# Patient Record
Sex: MALE | Race: WHITE | NOT HISPANIC OR LATINO | Employment: UNEMPLOYED | URBAN - METROPOLITAN AREA
[De-identification: names, ages, dates, MRNs, and addresses within clinical notes are randomized per-mention and may not be internally consistent; named-entity substitution may affect disease eponyms.]

---

## 2021-04-29 ENCOUNTER — NURSE TRIAGE (OUTPATIENT)
Dept: PEDIATRICS | Facility: CLINIC | Age: 31
End: 2021-04-29

## 2021-04-29 ENCOUNTER — OFFICE VISIT (OUTPATIENT)
Dept: PEDIATRICS | Facility: CLINIC | Age: 31
End: 2021-04-29
Payer: COMMERCIAL

## 2021-04-29 VITALS
OXYGEN SATURATION: 98 % | DIASTOLIC BLOOD PRESSURE: 80 MMHG | TEMPERATURE: 98.3 F | WEIGHT: 301.8 LBS | SYSTOLIC BLOOD PRESSURE: 146 MMHG | HEART RATE: 86 BPM

## 2021-04-29 DIAGNOSIS — R10.11 RIGHT UPPER QUADRANT PAIN: Primary | ICD-10-CM

## 2021-04-29 DIAGNOSIS — E11.65 TYPE 2 DIABETES MELLITUS WITH HYPERGLYCEMIA, WITHOUT LONG-TERM CURRENT USE OF INSULIN (H): ICD-10-CM

## 2021-04-29 LAB
ALBUMIN SERPL-MCNC: 4 G/DL (ref 3.4–5)
ALP SERPL-CCNC: 64 U/L (ref 40–150)
ALT SERPL W P-5'-P-CCNC: 95 U/L (ref 0–70)
ANION GAP SERPL CALCULATED.3IONS-SCNC: 13 MMOL/L (ref 3–14)
AST SERPL W P-5'-P-CCNC: 47 U/L (ref 0–45)
BASOPHILS # BLD AUTO: 0 10E9/L (ref 0–0.2)
BASOPHILS NFR BLD AUTO: 0.6 %
BILIRUB SERPL-MCNC: 1.1 MG/DL (ref 0.2–1.3)
BUN SERPL-MCNC: 12 MG/DL (ref 7–30)
CALCIUM SERPL-MCNC: 9.6 MG/DL (ref 8.5–10.1)
CHLORIDE SERPL-SCNC: 98 MMOL/L (ref 94–109)
CO2 SERPL-SCNC: 30 MMOL/L (ref 20–32)
CREAT SERPL-MCNC: 0.7 MG/DL (ref 0.66–1.25)
DIFFERENTIAL METHOD BLD: NORMAL
EOSINOPHIL # BLD AUTO: 0.1 10E9/L (ref 0–0.7)
EOSINOPHIL NFR BLD AUTO: 1.7 %
ERYTHROCYTE [DISTWIDTH] IN BLOOD BY AUTOMATED COUNT: 12.9 % (ref 10–15)
GFR SERPL CREATININE-BSD FRML MDRD: >90 ML/MIN/{1.73_M2}
GLUCOSE SERPL-MCNC: 256 MG/DL (ref 70–99)
HBA1C MFR BLD: 9.7 % (ref 0–5.6)
HCT VFR BLD AUTO: 43.9 % (ref 40–53)
HGB BLD-MCNC: 14.4 G/DL (ref 13.3–17.7)
LIPASE SERPL-CCNC: 61 U/L (ref 73–393)
LYMPHOCYTES # BLD AUTO: 2.4 10E9/L (ref 0.8–5.3)
LYMPHOCYTES NFR BLD AUTO: 34 %
MCH RBC QN AUTO: 28.9 PG (ref 26.5–33)
MCHC RBC AUTO-ENTMCNC: 32.8 G/DL (ref 31.5–36.5)
MCV RBC AUTO: 88 FL (ref 78–100)
MONOCYTES # BLD AUTO: 0.9 10E9/L (ref 0–1.3)
MONOCYTES NFR BLD AUTO: 12.9 %
NEUTROPHILS # BLD AUTO: 3.6 10E9/L (ref 1.6–8.3)
NEUTROPHILS NFR BLD AUTO: 50.8 %
PLATELET # BLD AUTO: 194 10E9/L (ref 150–450)
POTASSIUM SERPL-SCNC: 4.5 MMOL/L (ref 3.4–5.3)
PROT SERPL-MCNC: 7.7 G/DL (ref 6.8–8.8)
RBC # BLD AUTO: 4.99 10E12/L (ref 4.4–5.9)
SODIUM SERPL-SCNC: 141 MMOL/L (ref 133–144)
WBC # BLD AUTO: 7 10E9/L (ref 4–11)

## 2021-04-29 PROCEDURE — 99203 OFFICE O/P NEW LOW 30 MIN: CPT | Mod: GE | Performed by: STUDENT IN AN ORGANIZED HEALTH CARE EDUCATION/TRAINING PROGRAM

## 2021-04-29 PROCEDURE — 80053 COMPREHEN METABOLIC PANEL: CPT | Performed by: INTERNAL MEDICINE

## 2021-04-29 PROCEDURE — 36415 COLL VENOUS BLD VENIPUNCTURE: CPT | Performed by: INTERNAL MEDICINE

## 2021-04-29 PROCEDURE — 85025 COMPLETE CBC W/AUTO DIFF WBC: CPT | Performed by: INTERNAL MEDICINE

## 2021-04-29 PROCEDURE — 83690 ASSAY OF LIPASE: CPT | Performed by: INTERNAL MEDICINE

## 2021-04-29 PROCEDURE — 83036 HEMOGLOBIN GLYCOSYLATED A1C: CPT | Performed by: INTERNAL MEDICINE

## 2021-04-29 RX ORDER — FAMOTIDINE 40 MG/1
40 TABLET, FILM COATED ORAL DAILY
COMMUNITY

## 2021-04-29 RX ORDER — IBUPROFEN 800 MG/1
800 TABLET, FILM COATED ORAL EVERY 8 HOURS PRN
COMMUNITY

## 2021-04-29 RX ORDER — METFORMIN HCL 500 MG
TABLET, EXTENDED RELEASE 24 HR ORAL
Qty: 129 TABLET | Refills: 0 | Status: SHIPPED | OUTPATIENT
Start: 2021-04-29 | End: 2021-06-04

## 2021-04-29 RX ORDER — LANCETS
EACH MISCELLANEOUS
Qty: 30 EACH | Refills: 6 | Status: SHIPPED | OUTPATIENT
Start: 2021-04-29

## 2021-04-29 RX ORDER — GLUCOSAMINE HCL/CHONDROITIN SU 500-400 MG
CAPSULE ORAL
Qty: 100 EACH | Refills: 3 | Status: SHIPPED | OUTPATIENT
Start: 2021-04-29

## 2021-04-29 ASSESSMENT — PAIN SCALES - GENERAL: PAINLEVEL: MILD PAIN (3)

## 2021-04-29 NOTE — PROGRESS NOTES
Assessment & Plan     Right upper quadrant pain  ddx cholelithiasis, hepatis, pancreatitis, IBS less likely to be infectious (cholecystisis, cholangitis). Plan for labs and imaging as below. Discussed that if pain worsens, not tolerating oral intake, fevers, etc he should be seen sooner or go to the ER.   - Comprehensive metabolic panel (BMP + Alb, Alk Phos, ALT, AST, Total. Bili, TP)  - Lipase  - CBC with platelets and differential  - US Abdomen Complete; [id number]    New diagnosis type 2 diabetes  Glucose 256 on CMP and confirmed on A1c  - start metformin 500 mg and increase every few days to goal of 1000 mg BID  - diabetic education referral  - follow up in clinic within the next month  - glucometer  - called and discussed the above with El on 4/29/21 at 1500     Tobacco Cessation:   reports that he has been smoking. He has quit using smokeless tobacco.    Return in about 2 weeks (around 5/13/2021) for Follow up abdominal pain and blood pressure , Follow up.    Margo Bishop MD  Internal Medicine & Pediatrics PGY4  [facility]      Subjective   Antwan is a 31 year old who presents for the following health issues     HPI     Abdominal/Flank Pain  Onset/Duration: Abdominal pain upper right quadrant   Description:   Character: Sharp and Burning  Location: right upper quadrant  Radiation: through to back when pain increases  Intensity: mild, severe  Progression of Symptoms:  Worsening especially in the evenings   Accompanying Signs & Symptoms:  Fever/Chills: no  Gas/Bloating: no  Nausea: no  Vomitting: no  Diarrhea: loose stools   Constipation: no  Dysuria or Hematuria: no  History:   Trauma: no  Previous similar pain: YES - 2-3 years ago but only for a day or two  Previous tests done: none  Precipitating factors:   Does the pain change with:     Food: no    Bowel Movement: no    Urination: no  Therapies tried and outcome: Pepcid not really effective - takes this long term for reflux - pain is  different    Worse at night, builds throughout the day. 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 Comes and goes, with pain spikes to 8 out of 10, but sometimes cannot even feel it. No nausea, vomiting, diarrhea, constipation, fevers, chills, black or bloody stools, urinary symptoms. No IVDU, drinks 1-2 alcoholic beverages per week on average. No family history of liver, gallbladder or kidney disease. Pain does not wake him up at night. No injuries. Able to eat and drink normally - pain not related to this.     Review of Systems   Constitutional, HEENT, cardiovascular, pulmonary, GI, , musculoskeletal, neuro, skin, endocrine and psych systems are negative, except as otherwise noted.      Objective    BP (!) 146/80   Pulse 86   Temp 98.3  F (36.8  C)   Wt 136.9 kg (301 lb 12.8 oz)   SpO2 98%   There is no height or weight on file to calculate BMI.  Physical Exam   GENERAL: healthy, alert and no distress  EYES: Eyes grossly normal to inspection, conjunctivae and sclerae normal  NECK: no adenopathy, no asymmetry, masses, or scars   RESP: lungs clear to auscultation - no rales, rhonchi or wheezes  CV: regular rate and rhythm, normal S1 S2, no S3 or S4, no murmur, no peripheral edema and peripheral pulses strong  ABDOMEN: soft, obese, non-tender, unable to palpate liver or spleen due to body habitus, bowel sounds normal, no rebound or guarding  MS: no gross musculoskeletal defects noted, no edema, no CVA tenderness  SKIN: no suspicious lesions or rashes over abdomen  NEURO: mentation intact and speech normal  PSYCH: mentation appears normal, affect normal/bright    Results for orders placed or performed in visit on 04/29/21 (from the past 24 hour(s))   Comprehensive metabolic panel (BMP + Alb, Alk Phos, ALT, AST, Total. Bili, TP)   Result Value Ref Range    Sodium 141 133 - 144 mmol/L    Potassium 4.5 3.4 - 5.3 mmol/L    Chloride 98 94 - 109 mmol/L    Carbon Dioxide 30 20 - 32 mmol/L    Anion Gap 13 3 - 14 mmol/L     Glucose 256 (H) 70 - 99 mg/dL    Urea Nitrogen 12 7 - 30 mg/dL    Creatinine 0.70 0.66 - 1.25 mg/dL    GFR Estimate >90 >60 mL/min/[1.73_m2]    GFR Estimate If Black >90 >60 mL/min/[1.73_m2]    Calcium 9.6 8.5 - 10.1 mg/dL    Bilirubin Total 1.1 0.2 - 1.3 mg/dL    Albumin 4.0 3.4 - 5.0 g/dL    Protein Total 7.7 6.8 - 8.8 g/dL    Alkaline Phosphatase 64 40 - 150 U/L    ALT 95 (H) 0 - 70 U/L    AST 47 (H) 0 - 45 U/L   CBC with platelets and differential   Result Value Ref Range    WBC 7.0 4.0 - 11.0 10e9/L    RBC Count 4.99 4.4 - 5.9 10e12/L    Hemoglobin 14.4 13.3 - 17.7 g/dL    Hematocrit 43.9 40.0 - 53.0 %    MCV 88 78 - 100 fl    MCH 28.9 26.5 - 33.0 pg    MCHC 32.8 31.5 - 36.5 g/dL    RDW 12.9 10.0 - 15.0 %    Platelet Count 194 150 - 450 10e9/L    % Neutrophils 50.8 %    % Lymphocytes 34.0 %    % Monocytes 12.9 %    % Eosinophils 1.7 %    % Basophils 0.6 %    Absolute Neutrophil 3.6 1.6 - 8.3 10e9/L    Absolute Lymphocytes 2.4 0.8 - 5.3 10e9/L    Absolute Monocytes 0.9 0.0 - 1.3 10e9/L    Absolute Eosinophils 0.1 0.0 - 0.7 10e9/L    Absolute Basophils 0.0 0.0 - 0.2 10e9/L    Diff Method Automated Method        Physician Attestation   I, Felix Nichols MD, agree with the findings and plan of care as documented in the note.      Items personally reviewed/procedural attestation: vitals, labs and agree with the interpretation documented in the note.    Felix Nichols MD

## 2021-04-29 NOTE — PROGRESS NOTES
The Pt arrived to the clinic at 0900 reporting URQ ABD pain. The pain started a week ago, has been intermittent. Pain is dull at times, then sharp. Rating pain currently at a 3/10. Last night had an episode of pain at a 8/10. Lasted about 1.5 hours. Denies vomiting or loose stools. Stools have been softer than normal. No dark, tarry stools or blood in the stool. No fevers. No hx of gall bladder concerns. Has hx of GERD, however is well controlled. Denies chest pain or chest pressure. No trouble breathing. No SOB. Has a hx of SBO 3 years ago. Passing gas and stool without difficulty.     This pain has been off and on for the last year, with a worsening in the last week.     Patient is resting comfortably in a chair, not in apparent distress. Vitals entered.     Huddled with TC, scheduled with resident at 0930.     Sharonda Osborn RN   [facility] -- Triage Nurse

## 2021-04-29 NOTE — TELEPHONE ENCOUNTER
The Pt arrived to the clinic at 0900 reporting URQ ABD pain. The pain started a week ago, has been intermittent. Pain is dull at times, then sharp. Rating pain currently at a 3/10. Last night had an episode of pain at a 8/10. Lasted about 1.5 hours. Denies vomiting or loose stools. Stools have been softer than normal. No dark, tarry stools or blood in the stool. No fevers. No hx of gall bladder concerns. Has hx of GERD, however is well controlled. Denies chest pain or chest pressure. No trouble breathing. No SOB.     Patient is resting comfortably in a chair, not in apparent distress. Vitals entered.     Huddled with TC, scheduled with resident at 0930.     Sharonda Osborn RN   [facility] -- Triage Nurse        Additional Information    Negative: Passed out (i.e., fainted, collapsed and was not responding)    Negative: Shock suspected (e.g., cold/pale/clammy skin, too weak to stand, low BP, rapid pulse)    Negative: Sounds like a life-threatening emergency to the triager    Pain is mainly in upper abdomen (if needed ask: 'is it mainly above the belly button?')    Negative: Passed out (i.e., fainted, collapsed and was not responding)    Negative: Shock suspected (e.g., cold/pale/clammy skin, too weak to stand, low BP, rapid pulse)    Negative: Visible sweat on face or sweat is dripping down    Negative: Chest pain    Negative: SEVERE abdominal pain (e.g., excruciating)    Negative: Pain lasting > 10 minutes and over 50 years old    Negative: Pain lasting > 10 minutes and over 40 years old and associated chest, arm, neck, upper back, or jaw pain    Negative: Pain lasting > 10 minutes and over 35 years old and at least one cardiac risk factor    Negative: Pain lasting > 10 minutes and history of heart disease (i.e., heart attack, bypass surgery, angina, angioplasty, CHF)    Negative: Recent injury to the abdomen    Negative: Vomiting red blood or black (coffee ground) material    Negative: Bloody, black, or tarry  "bowel movements    Negative: Pregnant > 24 weeks and hand or face swelling    Negative: Constant abdominal pain lasting > 2 hours    Negative: Vomiting bile (green color)    Negative: Patient sounds very sick or weak to the triager    Negative: Vomiting and abdomen looks much more swollen than usual    Negative: White of the eyes have turned yellow (i.e.,  jaundice)    Negative: Fever > 103 F (39.4 C)    Negative: Fever > 101 F (38.3 C) and over 60 years of age    Negative: Fever > 100.0 F (37.8 C) and has diabetes mellitus or a weak immune system (e.g., HIV positive, cancer chemotherapy, organ transplant, splenectomy, chronic steroids)    Negative: Fever > 100.0 F (37.8 C) and bedridden (e.g., nursing home patient, stroke, chronic illness, recovering from surgery)    Answer Assessment - Initial Assessment Questions  1. LOCATION: \"Where does it hurt?\"       URQ  2. RADIATION: \"Does the pain shoot anywhere else?\" (e.g., chest, back)      Radiates to the back, mainly in the evenings.   3. ONSET: \"When did the pain begin?\" (Minutes, hours or days ago)       Started last Monday  4. SUDDEN: \"Gradual or sudden onset?\"      Gradual   5. PATTERN \"Does the pain come and go, or is it constant?\"     - If constant: \"Is it getting better, staying the same, or worsening?\"       (Note: Constant means the pain never goes away completely; most serious pain is constant and it progresses)      - If intermittent: \"How long does it last?\" \"Do you have pain now?\"      (Note: Intermittent means the pain goes away completely between bouts)      Intermittent, constant at night.   6. SEVERITY: \"How bad is the pain?\"  (e.g., Scale 1-10; mild, moderate, or severe)     - MILD (1-3): doesn't interfere with normal activities, abdomen soft and not tender to touch      - MODERATE (4-7): interferes with normal activities or awakens from sleep, tender to touch      - SEVERE (8-10): excruciating pain, doubled over, unable to do any normal activities  " "      Rating at 3/10. Last night was 8/10, lasted about 1.5 hours.   7. RECURRENT SYMPTOM: \"Have you ever had this type of abdominal pain before?\" If so, ask: \"When was the last time?\" and \"What happened that time?\"       Happened a couple of years ago, Patient treated at home, it did resolve.   8. CAUSE: \"What do you think is causing the abdominal pain?\"      Unknown.   9. RELIEVING/AGGRAVATING FACTORS: \"What makes it better or worse?\" (e.g., movement, antacids, bowel movement)      Laying down makes it better, better on the Right side. Getting up and moving helps. Sitting makes it worse.   10. OTHER SYMPTOMS: \"Has there been any vomiting, diarrhea, constipation, or urine problems?\"        BM's have been softer than usual, hx of bowl obstruction 3 years ago.    Protocols used: ABDOMINAL PAIN - MALE-A-OH, ABDOMINAL PAIN - UPPER-A-OH      "

## 2021-04-30 ENCOUNTER — TELEPHONE (OUTPATIENT)
Dept: PEDIATRICS | Facility: CLINIC | Age: 31
End: 2021-04-30

## 2021-04-30 NOTE — TELEPHONE ENCOUNTER
Diabetes Education Scheduling Outreach #1:    Call to patient to schedule. Left message with phone number to call to schedule.    Plan for 2nd outreach attempt within 1 week.    Christy Chang  [place] [facility]  Diabetes and Nutrition Scheduling

## 2021-05-02 ENCOUNTER — HEALTH MAINTENANCE LETTER (OUTPATIENT)
Age: 31
End: 2021-05-02

## 2021-05-04 ENCOUNTER — TELEPHONE (OUTPATIENT)
Dept: PEDIATRICS | Facility: CLINIC | Age: 31
End: 2021-05-04

## 2021-05-04 ENCOUNTER — HOSPITAL ENCOUNTER (OUTPATIENT)
Dept: ULTRASOUND IMAGING | Facility: CLINIC | Age: 31
Discharge: HOME | End: 2021-05-04
Attending: INTERNAL MEDICINE | Admitting: INTERNAL MEDICINE

## 2021-05-04 DIAGNOSIS — R10.11 RIGHT UPPER QUADRANT PAIN: ICD-10-CM

## 2021-05-04 PROCEDURE — 76700 US EXAM ABDOM COMPLETE: CPT

## 2021-05-04 NOTE — TELEPHONE ENCOUNTER
Responded to his questions in the result note for the ultrasound.  Please refer to that encounter for recommendations.    Felix Nichols MD

## 2021-05-04 NOTE — TELEPHONE ENCOUNTER
Patient left a voicemail on [facility] direct line.     Patient is requesting results and plan from Abdominal Ultrasound on 5/4/21.    Routing to Dr. Nichols to advise.     Tyler Tucker RN on 5/4/2021 at 3:52 PM

## 2021-05-04 NOTE — RESULT ENCOUNTER NOTE
Dear Antwan,    The results of your ultrasound show that you have NAFLD (non-alcoholic fatty liver disease), meaning you have fat infiltrates in your liver, which is the cause of your elevation in liver enzymes.  The good news is you do not have any gall bladder stones or blocked gall bladder ducts.  The treatment for NAFLD is to manage your chronic medical conditions, including the new diagnosis of diabetes, and aggressive lifestyle changes (diet and exercise and weight loss) and monitor your liver enzymes.  You may want to discuss starting a cholesterol medication as well to treat the fat in your liver.    As discussed with Dr. Bishop, I recommend you start metformin and follow-up with the diabetic educator in the next week or so to learn about how to manage diabetes and how to check your glucose levels.  I recommend you schedule a follow-up appointment with a provider in clinic in 1 month, after you have met with the diabetic educator and learned how to log your sugar levels.  You should bring your glucose log to your visit.    Please feel free to call with any questions.      Sincerely,    Felix Nichols MD

## 2021-05-05 NOTE — TELEPHONE ENCOUNTER
Called pt and discussed results and instructions of the ultrasound. He verbalizes understanding and will await diabetic ed appt. Yolanda Escalona RN on 5/5/2021 at 10:55 AM

## 2021-05-10 NOTE — TELEPHONE ENCOUNTER
Diabetes Education Scheduling Outreach #2:    [email or web address] message sent to call to schedule.    [facility]  Diabetes and Nutrition Scheduling

## 2021-05-26 ENCOUNTER — RECORDS - HEALTHEAST (OUTPATIENT)
Dept: ADMINISTRATIVE | Facility: CLINIC | Age: 31
End: 2021-05-26

## 2021-05-28 ENCOUNTER — RECORDS - HEALTHEAST (OUTPATIENT)
Dept: ADMINISTRATIVE | Facility: CLINIC | Age: 31
End: 2021-05-28

## 2021-08-22 ENCOUNTER — HEALTH MAINTENANCE LETTER (OUTPATIENT)
Age: 31
End: 2021-08-22

## 2021-10-17 ENCOUNTER — HEALTH MAINTENANCE LETTER (OUTPATIENT)
Age: 31
End: 2021-10-17

## 2021-12-12 ENCOUNTER — HEALTH MAINTENANCE LETTER (OUTPATIENT)
Age: 31
End: 2021-12-12

## 2022-04-02 ENCOUNTER — HEALTH MAINTENANCE LETTER (OUTPATIENT)
Age: 32
End: 2022-04-02

## 2022-05-28 ENCOUNTER — HEALTH MAINTENANCE LETTER (OUTPATIENT)
Age: 32
End: 2022-05-28

## 2022-07-23 ENCOUNTER — HEALTH MAINTENANCE LETTER (OUTPATIENT)
Age: 32
End: 2022-07-23

## 2022-10-01 ENCOUNTER — HEALTH MAINTENANCE LETTER (OUTPATIENT)
Age: 32
End: 2022-10-01

## 2023-02-05 ENCOUNTER — HEALTH MAINTENANCE LETTER (OUTPATIENT)
Age: 33
End: 2023-02-05

## 2023-05-14 ENCOUNTER — HEALTH MAINTENANCE LETTER (OUTPATIENT)
Age: 33
End: 2023-05-14

## 2023-06-04 ENCOUNTER — HEALTH MAINTENANCE LETTER (OUTPATIENT)
Age: 33
End: 2023-06-04

## 2023-10-21 ENCOUNTER — HEALTH MAINTENANCE LETTER (OUTPATIENT)
Age: 33
End: 2023-10-21

## 2024-03-03 ENCOUNTER — HEALTH MAINTENANCE LETTER (OUTPATIENT)
Age: 34
End: 2024-03-03